# Patient Record
Sex: MALE | URBAN - METROPOLITAN AREA
[De-identification: names, ages, dates, MRNs, and addresses within clinical notes are randomized per-mention and may not be internally consistent; named-entity substitution may affect disease eponyms.]

---

## 2024-10-15 ENCOUNTER — APPOINTMENT (OUTPATIENT)
Dept: URBAN - METROPOLITAN AREA CLINIC 255 | Age: 89
Setting detail: DERMATOLOGY
End: 2024-10-28

## 2024-10-15 PROBLEM — C44.311 BASAL CELL CARCINOMA OF SKIN OF NOSE: Status: ACTIVE | Noted: 2024-10-15

## 2024-10-15 PROBLEM — C44.229 SQUAMOUS CELL CARCINOMA OF SKIN OF LEFT EAR AND EXTERNAL AURICULAR CANAL: Status: ACTIVE | Noted: 2024-10-15

## 2024-10-15 PROCEDURE — 99202 OFFICE O/P NEW SF 15 MIN: CPT

## 2024-10-15 PROCEDURE — OTHER CONSULTATION FOR RADIOTHERAPY: OTHER

## 2024-10-15 PROCEDURE — OTHER MIPS QUALITY: OTHER

## 2024-10-15 NOTE — PROCEDURE: CONSULTATION FOR RADIOTHERAPY
Detail Level: Detailed
Anatomic Location From Referring Provider: Columella of Nose
Referring Provider: Patricia Hernandez MD
Size Of Lesion: 1
X Size Of Lesion In Cm (Optional): 0.8
Other Plan: decision pending VOB results
Body Location Override (Optional - Billing Will Still Be Based On Selected Body Map Location If Applicable): Left Nasal Tip
Anatomic Location From Referring Provider: Left Tip of Nose
X Size Of Lesion In Cm (Optional): 0.6
Other Plan: decision pending VOB
Body Location Override (Optional - Billing Will Still Be Based On Selected Body Map Location If Applicable): Left Postauricular Area
Size Of Lesion: 1.2
X Size Of Lesion In Cm (Optional): 0.7

## 2024-10-15 NOTE — PROCEDURE: MIPS QUALITY
Detail Level: Detailed
Quality 143: Oncology: Medical And Radiation- Pain Intensity Quantified: Pain severity quantified, pain present
Quality 226: Preventive Care And Screening: Tobacco Use: Screening And Cessation Intervention: Patient screened for tobacco use and is an ex/non-smoker
Quality 130: Documentation Of Current Medications In The Medical Record: Current Medications Documented
Quality 431: Preventive Care And Screening: Unhealthy Alcohol Use - Screening: Patient not identified as an unhealthy alcohol user when screened for unhealthy alcohol use using a systematic screening method

## 2024-11-25 ENCOUNTER — APPOINTMENT (OUTPATIENT)
Dept: URBAN - METROPOLITAN AREA CLINIC 256 | Age: 89
Setting detail: DERMATOLOGY
End: 2024-11-25